# Patient Record
Sex: MALE | Race: ASIAN
[De-identification: names, ages, dates, MRNs, and addresses within clinical notes are randomized per-mention and may not be internally consistent; named-entity substitution may affect disease eponyms.]

---

## 2018-03-28 ENCOUNTER — HOSPITAL ENCOUNTER (EMERGENCY)
Dept: HOSPITAL 36 - ER | Age: 48
LOS: 1 days | Discharge: HOME | End: 2018-03-29
Payer: SELF-PAY
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
## 2018-03-28 DIAGNOSIS — S83.92XA: Primary | ICD-10-CM

## 2018-03-28 DIAGNOSIS — G89.29: ICD-10-CM
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
## 2018-03-28 PROCEDURE — 96372 THER/PROPH/DIAG INJ SC/IM: CPT

## 2018-03-28 PROCEDURE — 99283 EMERGENCY DEPT VISIT LOW MDM: CPT

## 2018-03-28 PROCEDURE — Z7502: HCPCS

## 2018-03-29 NOTE — ED PHYSICIAN CHART
ED Chief Complaint/HPI





- Patient Information


Date Seen:: 03/28/18


Time Seen:: 23:35


Chief Complaint:: Left Knee Pain


History of Present Illness:: 





onset x 3 days of dull, intermittent MS type left knee pain; pt denies trauma, H

/As, neck pain, C/P, SOB, cough, Abd. Pain, A/N/V/D/C, fev er, chills, 

paresthesias, gait changes, weakness, dizziness, vertigo, or urinary s/s


Allergies:: 


 Allergies











Allergy/AdvReac Type Severity Reaction Status Date / Time


 


No Known Allergies Allergy   Verified 03/28/18 23:36











Vitals:: 


 Vital Signs - 8 hr











  03/28/18





  23:35


 


Temp 97.8 F


 


HR 98


 


RR 18


 


/88


 


O2 Sat % 98











Historian:: Patient


Review:: Nurse's Note Reviewed





ED Review of Systems





- Review of Systems


General/Constitutional: No fever, No chills, No weight loss, No weakness, No 

diaphoresis, No edema, No loss of appetite


Skin: No skin lesions, No rash, No bruising


Head: No headache, No light-headedness


Eyes: No loss of vision, No pain, No diplopia


ENT: No earache, No nasal drainage, No sore throat, No tinnitus


Neck: No neck pain, No swelling, No thyromegaly, No stiffness, No mass noted


Cardio Vascular: No chest pain, No palpitations, No PND, No orthopnea, No edema


Pulmonary: No SOB, No cough, No sputum, No wheezing


GI: No nausea, No vomiting, No diarrhea, No pain, No melena, No hematochezia, 

No constipation, No hematemesis


G/U: No dysuria, No frequency, No hematuria, No nacturia


Musculoskeletal: Bone or joint pain, No back pain, Muscle pain


Endocrine: No polyuria, No polydipsia


Psychiatric: No prior psych history, No depression, No anxiety, No suicidal 

ideation, No homicidal ideation, No auditory hallucination, No visual 

hallucination


Hematopoietic: No bruising, No lymphadenopathy


Allergic/Immuno: No urticaria, No angioedema


Neurological: No syncope, No focal symptoms, No weakness, No paresthesia, No 

headache, No seizure, No dizziness, No confusion, No vertigo





ED Past Medical History





- Past Medical History


Obtainable: Yes


Past Medical History: Arthritis


Family History: None


Social History: Non Smoker, No Alcohol, No Drug Use, 


Surgical History: None


Psychiatricy History: None


Medication: Reviewed





Family Medical History





- Family Member


  ** Father


Hx Family Coronary Artery Disease: Yes





ED Physical Exam





- Physical Examination


General/Constitutional: Awake, Well-developed, well-nourished, Alert, No 

distress, GCS 15, Non-toxic appearing, Ambulatory


Head: Atraumatic


Eyes: Lids, conjuctiva normal, PERRL, EOMI


Skin: Nl inspection, No rash, No skin lesions, No ecchymosis, Well hydrated, No 

lymphadenopathy


ENMT: External ears, nose nl, TM canals nl, Nasal exam nl, Lips, teeth, gums nl

, Oropharynx nl, Tonsils nl


Neck: Nontender, Full ROM w/o pain, No JVD, No nuchal rigidity, No bruit, No 

mass, No stridor


Other Neck comments:: 





supple; no meningeal signs; no cervical tenderness; no bruits


Respiratory: Nl effort/Exclusion, Clear to Auscultation, No Wheeze/Rhonchi/Rales


Cardio Vascular: RRR, No murmur, gallop, rubs, NL S1 S2, Carotid/Femoral/Distal 

pulses equal bilaterally


GI: No tenderness/rebounding/guarding, No organomegaly, No hernia, Normal BS's, 

Nondistended, No mass/bruits, No McBurney tenderness, Rectum exam nl


Other GI comments:: 





no pulsatile masses


: No CVA tenderness


Extremities: No tenderness or effusion, Full ROM, normal strength in all 

extremities, No edema, Normal digits & nails


Other Extremities comments:: 





Left Knee tenderness; no loss of ROMs; no septic joints; no ligament instability

; DTRs: 2+ bilaterally; Gait: WNL; good motor, tendon, and sensory functions; 

good NV functions; no calf tenderness; -Kimberley's sign; no DVT s/s


Neuro/Psych: Alert/oriented, DTR's symmetric, Normal sensory exam, Normal motor 

strength, Judgement/insight normal, Mood normal, Normal gait, No focal deficits


Other Neuro/Psych comments:: 





no focal signs


Misc: Normal back, No paraspinal tenderness





ED Labs/Radiology/EKG Results





- Radiology Results


Comments:: 





X-Rays: deferred by pt; U/S: deferred by pt





ED Septic Shock





- .


Is Septic Shock (SBP<90, OR Lactate>4 mmol\L) present?: No





- <6hrs of presentation:


Vital Signs: 


 Vital Signs - 8 hr











  03/28/18





  23:35


 


Temp 97.8 F


 


HR 98


 


RR 18


 


/88


 


O2 Sat % 98














ED Reassessment (Disposition)





- Reassessment


Reassessment:: 





pt is asymptomatic upon discharge


Reassessment Condition:: Improved





- Diagnosis


Diagnosis:: 





Left Knee Pain; Left Knee Sprain and Strain; Osteoarthritis; Chronic Left knee 

Pain





- Aftercare/Follow up Instructions


Aftercare/Follow-Up Instructions:: Counseled pt regarding lab results/diagnosis 

& need follow up, Refer to Discharge Instructions, Counseled pt & family 

regarding lab results/diagnosis & need follow up





- Patient Disposition


Discharge/Transfer:: Home


Condition at Disposition:: Stable, Improved (RTER prn if existing s/s reoccur 

and/or get worse and/or any other new s/s occur; ACIs given for all above Dx; 

refer to Orthopedist/Internist/Rheumatologist ASAP; F/U with PMD in one day or 

prn; RTER prn if concerned)





ED Discharge Plan





- Patient Disposition


Instructions:  Knee Sprain, Easy-to-Read


Additional Instructions: 


FOLLOW UP WITH YOUR DOCTOR OR COUNTY CLINIC IN 2-3 DAYS AND TO COME BACK TO ER 

IF SYMPTOMS WORSEN. TAKE TYLENOL OR MOTRIN FOP RPAINM PRN